# Patient Record
Sex: MALE | Race: WHITE
[De-identification: names, ages, dates, MRNs, and addresses within clinical notes are randomized per-mention and may not be internally consistent; named-entity substitution may affect disease eponyms.]

---

## 2018-12-29 ENCOUNTER — HOSPITAL ENCOUNTER (EMERGENCY)
Dept: HOSPITAL 62 - ER | Age: 68
Discharge: HOME | End: 2018-12-29
Payer: COMMERCIAL

## 2018-12-29 VITALS — DIASTOLIC BLOOD PRESSURE: 92 MMHG | SYSTOLIC BLOOD PRESSURE: 174 MMHG

## 2018-12-29 DIAGNOSIS — S22.31XA: Primary | ICD-10-CM

## 2018-12-29 DIAGNOSIS — M54.9: ICD-10-CM

## 2018-12-29 DIAGNOSIS — R05: ICD-10-CM

## 2018-12-29 DIAGNOSIS — R07.9: ICD-10-CM

## 2018-12-29 DIAGNOSIS — F17.210: ICD-10-CM
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
## 2018-12-29 PROCEDURE — 99283 EMERGENCY DEPT VISIT LOW MDM: CPT

## 2018-12-29 PROCEDURE — 96372 THER/PROPH/DIAG INJ SC/IM: CPT

## 2018-12-29 PROCEDURE — 71101 X-RAY EXAM UNILAT RIBS/CHEST: CPT

## 2018-12-29 NOTE — RADIOLOGY REPORT (SQ)
EXAM DESCRIPTION:  RIBS RIGHT W/PA CHEST



COMPLETED DATE/TIME:  12/29/2018 11:43 am



REASON FOR STUDY:  rib pain, injury



COMPARISON:  None.



TECHNIQUE:  Frontal view of the chest and additional views of the right ribs acquired.



NUMBER OF VIEWS:  Four view.



LIMITATIONS:  None.



FINDINGS:  FRONTAL CXR: No pneumothorax.  No pleural effusion.  No atelectasis or infiltrates.

RIBS: Fracture inferior right 10th posterior rib.

OTHER: No other significant finding.



IMPRESSION:  Right posterior rib fracture.  No pneumothorax or contusion.



COMMENT:  SITE OF TRAUMA/COMPLAINT MARKED/STAMP COMPLETED: NO.



TECHNICAL DOCUMENTATION:  JOB ID:  [id number]

 2011 [employer]- All Rights Reserved



Reading location - IP/workstation name: [email or web address]

## 2018-12-29 NOTE — ER DOCUMENT REPORT
ED General





- General


Chief Complaint: Fall


Stated Complaint: FALL/BACK AND CHEST PAIN


Time Seen by Provider: 12/29/18 11:13


Notes: 





Patient is a 68-year-old male that presents to the emergency department for 

chief complaint of right rib pain after injury.  Patient states that he was 

having cough, and was seen by his primary care physician on Wednesday, 

prescribed antibiotics, and cough syrup, was improving, but he had a significant

coughing spell, to the point where he coughed so hard that he briefly passed 

out, and when he did he struck the right ribs onto the edge of the bathtub.  He 

states his had significant pain since then, he currently rates the pain as a 9 

out of 10, describes it as a sharp and worse pain with deep breath.  Mainly in 

the lower ribs.  He denies any head injury or neck injury.  Denies having a 

headache or neck pain.  He also denies having any numbness, tingling or 

weakness.





Past Medical History: Denies chronic medical conditions


Past Surgical History: Cholecystectomy


Social History: Admits to smoking cigarettes daily, denies alcohol or drug use.


Family History: Reviewed and noncontributory for presenting illness


Allergies: Reviewed, see documented allergy list. 





REVIEW OF SYSTEMS:


Other than noted above, the 12 point review of systems was reviewed with the 

patient and were negative, all pertinent findings are included in the HPI.





PHYSICAL EXAMINATION:





Vital signs reviewed, nursing noted reviewed. 





GENERAL: Well-appearing, well-nourished and in no acute distress.





HEAD: Atraumatic, normocephalic.





EYES: Eyes appear normal, extraocular movements intact, sclera anicteric, 

conjunctiva are normal.  PERRLA





ENT: nares patent, oropharynx clear without exudates.  Moist mucous membranes.





NECK: Normal range of motion, supple without lymphadenopathy no midline 

tenderness.





LUNGS: Breath sounds clear to auscultation bilaterally and equal.  No wheezes 

rales or rhonchi.  There is point tenderness over the posterior lateral right 

lower ribs with palpation.  No ecchymosis, flail chest, step-off or deformity 

noted.





HEART: Regular rate and rhythm without murmurs





ABDOMEN: Soft, nontender, normoactive bowel sounds.  No rebound, guarding, or 

rigidity. No masses appreciated.





EXTREMITIES: Nontender, good range of motion, no pitting or edema.  





NEUROLOGICAL: No focal neurological deficits. Moves all extremities 

spontaneously Motor and sensory grossly intact on exam.





PSYCH: Normal mood, normal affect.





SKIN: Warm, Dry, normal turgor, no rashes or lesions noted on exposed skin





TRAVEL OUTSIDE OF THE [place] IN LAST 30 DAYS: No





- Related Data


Allergies/Adverse Reactions: 


                                        





No Known Allergies Allergy (Verified 12/29/18 11:04)


   











Past Medical History





- Social History


Smoking Status: Current Every Day Smoker


Chew tobacco use (# tins/day): No


Frequency of alcohol use: Rare


Drug Abuse: None


Family History: Reviewed & Not Pertinent


Patient has suicidal ideation: No


Patient has homicidal ideation: No





- Past Medical History


Cardiac Medical History: 


   Denies: Hx Coronary Artery Disease, Hx Heart Attack, Hx Hypertension


Pulmonary Medical History: 


   Denies: Hx Asthma, Hx Bronchitis, Hx COPD, Hx Pneumonia


Neurological Medical History: Denies: Hx Cerebrovascular Accident


Renal/ Medical History: Denies: Hx Peritoneal Dialysis


Musculoskeletal Medical History: Denies Hx Arthritis


Past Surgical History: Reports: Hx Cholecystectomy





- Immunizations


Hx Diphtheria, Pertussis, Tetanus Vaccination: No





Physical Exam





- Vital signs


Vitals: 


                                        











Temp Pulse Resp BP Pulse Ox


 


 97.7 F   66   20   190/86 H  95 


 


 12/29/18 11:09  12/29/18 11:09  12/29/18 11:09  12/29/18 11:09  12/29/18 11:09














Course





- Re-evaluation


Re-evalutation: 


Patient seen and examined, vital signs reviewed, patient's exam was most 

consistent with either a fractured or contused rib on the right.  No evidence of

head injury or neck injury, no midline tenderness, cervical spine is cleared 

from a clinical standpoint.





Patient treated with Toradol and Lidoderm patch for his pain, x-rays obtained of

the ribs, did demonstrate single rib fracture on the right, patient given 

incentive spirometer, advised to look out for signs of pneumonia, however he is 

currently on Ceftin ear, that was prescribed by his primary care physician.  He 

is advised to monitor for signs of fever, worsening pain, or difficulty 

breathing.  I also discussed the patient his blood pressure was elevated on this

visit and should follow-up with his primary care physician, in this case most 

likely secondary to his pain.  Patient was agreeable to this plan of care and 

discharged home.








- Vital Signs


Vital signs: 


                                        











Temp Pulse Resp BP Pulse Ox


 


 98 F   70   16   174/92 H  98 


 


 12/29/18 12:42  12/29/18 12:42  12/29/18 12:42  12/29/18 12:42  12/29/18 12:42














Discharge





- Discharge


Clinical Impression: 


 Cough





Rib fracture


Qualifiers:


 Encounter type: initial encounter Rib fracture type: single rib Fracture type: 

closed Laterality: right Qualified Code(s): S22.31XA - Fracture of one rib, 

right side, initial encounter for closed fracture





Fall


Qualifiers:


 Encounter type: initial encounter Qualified Code(s): W19.XXXA - Unspecified 

fall, initial encounter





Condition: Stable


Disposition: HOME, SELF-CARE


Instructions:  Rib Injuries and Fractures ([facility])


Prescriptions: 


RX: Naproxen [Naprosyn] 500 mg PO BID PRN #30 tablet


 PRN Reason: rib pain


Oxycodone HCl/Acetaminophen [Percocet 5-325 mg Tablet] 1 tab PO ASDIR PRN #15 

tab


 PRN Reason: rib pain


Referrals: 


HANANE CAMP MD [[facility]] - Follow up in 3-5 days


(or your primary care


)

## 2020-05-21 ENCOUNTER — HOSPITAL ENCOUNTER (OUTPATIENT)
Dept: HOSPITAL 62 - OD | Age: 70
End: 2020-05-21
Attending: NURSE PRACTITIONER
Payer: COMMERCIAL

## 2020-05-21 DIAGNOSIS — M54.2: ICD-10-CM

## 2020-05-21 DIAGNOSIS — M47.812: Primary | ICD-10-CM

## 2020-05-21 PROCEDURE — 72050 X-RAY EXAM NECK SPINE 4/5VWS: CPT

## 2020-05-21 NOTE — RADIOLOGY REPORT (SQ)
EXAM DESCRIPTION:  C SP 4 OR 5 VIEWS



IMAGES COMPLETED DATE/TIME:  5/21/2020 3:05 pm



REASON FOR STUDY:  NECK PAIN,ACUTE M54.2  CERVICALGIA



COMPARISON:  None.



NUMBER OF VIEWS:  Five views including obliques.



TECHNIQUE:  AP, lateral, obliques and odontoid radiographic images acquired of the cervical spine.



LIMITATIONS:  None.



FINDINGS:  MINERALIZATION: Normal.

ALIGNMENT: 5 mm anterolisthesis of C3 on C4

VERTEBRAE: Maintained height.  No fracture or worrisome bone lesion.

DISCS: Multilevel disc space narrowing with osteophytes.

POSTERIOR ELEMENTS: Pedicles and facets are intact.  No posterior arch defects.

Facet arthropathy is present.

FORAMINA: Narrowed at the levels of maximal disc and facet disease.

HARDWARE: None in the spine.

PARASPINAL SOFT TISSUES: Normal.

OTHER: No other significant finding.



IMPRESSION:  CHRONIC DEGENERATIVE CHANGES.  NO ACUTE FINDINGS.



TECHNICAL DOCUMENTATION:  JOB ID:  [id number]

 2011 [employer]- All Rights Reserved



Reading location - IP/workstation name: [email or web address]

## 2020-08-20 ENCOUNTER — HOSPITAL ENCOUNTER (OUTPATIENT)
Dept: HOSPITAL 62 - OD | Age: 70
End: 2020-08-20
Attending: NURSE PRACTITIONER
Payer: COMMERCIAL

## 2020-08-20 DIAGNOSIS — I20.9: ICD-10-CM

## 2020-08-20 DIAGNOSIS — I10: Primary | ICD-10-CM

## 2020-08-20 LAB
ALBUMIN SERPL-MCNC: 5.2 G/DL (ref 3.5–5)
ALP SERPL-CCNC: 59 U/L (ref 38–126)
ANION GAP SERPL CALC-SCNC: 13 MMOL/L (ref 5–19)
AST SERPL-CCNC: 25 U/L (ref 17–59)
BILIRUB DIRECT SERPL-MCNC: 0.1 MG/DL (ref 0–0.4)
BILIRUB SERPL-MCNC: 0.7 MG/DL (ref 0.2–1.3)
BUN SERPL-MCNC: 28 MG/DL (ref 7–20)
CALCIUM: 10.2 MG/DL (ref 8.4–10.2)
CHLORIDE SERPL-SCNC: 105 MMOL/L (ref 98–107)
CK MB SERPL-MCNC: 1.05 NG/ML (ref ?–4.55)
CO2 SERPL-SCNC: 24 MMOL/L (ref 22–30)
ERYTHROCYTE [DISTWIDTH] IN BLOOD BY AUTOMATED COUNT: 12.7 % (ref 11.5–14)
GLUCOSE SERPL-MCNC: 149 MG/DL (ref 75–110)
HCT VFR BLD CALC: 39.9 % (ref 37.9–51)
HGB BLD-MCNC: 13.8 G/DL (ref 13.5–17)
MCH RBC QN AUTO: 30.1 PG (ref 27–33.4)
MCHC RBC AUTO-ENTMCNC: 34.5 G/DL (ref 32–36)
MCV RBC AUTO: 87 FL (ref 80–97)
PLATELET # BLD: 347 10^3/UL (ref 150–450)
POTASSIUM SERPL-SCNC: 5.1 MMOL/L (ref 3.6–5)
PROT SERPL-MCNC: 8.2 G/DL (ref 6.3–8.2)
RBC # BLD AUTO: 4.58 10^6/UL (ref 4.35–5.55)
TROPONIN I SERPL-MCNC: < 0.012 NG/ML
WBC # BLD AUTO: 6.9 10^3/UL (ref 4–10.5)

## 2020-08-20 PROCEDURE — 83036 HEMOGLOBIN GLYCOSYLATED A1C: CPT

## 2020-08-20 PROCEDURE — 85027 COMPLETE CBC AUTOMATED: CPT

## 2020-08-20 PROCEDURE — 80053 COMPREHEN METABOLIC PANEL: CPT

## 2020-08-20 PROCEDURE — 84484 ASSAY OF TROPONIN QUANT: CPT

## 2020-08-20 PROCEDURE — 36415 COLL VENOUS BLD VENIPUNCTURE: CPT

## 2020-08-20 PROCEDURE — 82553 CREATINE MB FRACTION: CPT
